# Patient Record
Sex: FEMALE | Race: WHITE | Employment: UNEMPLOYED | URBAN - METROPOLITAN AREA
[De-identification: names, ages, dates, MRNs, and addresses within clinical notes are randomized per-mention and may not be internally consistent; named-entity substitution may affect disease eponyms.]

---

## 2018-06-30 ENCOUNTER — HOSPITAL ENCOUNTER (EMERGENCY)
Age: 43
Discharge: HOME | End: 2018-06-30
Attending: EMERGENCY MEDICINE

## 2018-06-30 VITALS
DIASTOLIC BLOOD PRESSURE: 72 MMHG | WEIGHT: 110 LBS | HEIGHT: 60 IN | TEMPERATURE: 98 F | RESPIRATION RATE: 18 BRPM | BODY MASS INDEX: 21.6 KG/M2 | SYSTOLIC BLOOD PRESSURE: 101 MMHG | HEART RATE: 86 BPM | OXYGEN SATURATION: 96 %

## 2018-06-30 DIAGNOSIS — R45.851 SUICIDAL IDEATION: Primary | ICD-10-CM

## 2018-06-30 PROCEDURE — 99284 EMERGENCY DEPT VISIT MOD MDM: CPT

## 2018-06-30 ASSESSMENT — ENCOUNTER SYMPTOMS
TROUBLE SWALLOWING: 0
BLOOD IN STOOL: 0
VOMITING: 0
CHEST TIGHTNESS: 0
DIARRHEA: 0
NAUSEA: 0
ABDOMINAL PAIN: 0
RHINORRHEA: 0
SORE THROAT: 0
CONSTIPATION: 0
WHEEZING: 0
COUGH: 0
SHORTNESS OF BREATH: 0

## 2018-07-01 NOTE — ED PROVIDER NOTES
ED ATTENDING NOTE    I saw and examined the patient. I discussed the history and examination with the resident and agree with the plan of care         HPI: Pt presents with SI, no ingestion or plan, pt is inmate, pt denies f/c, ha, cp, sob, cough, ap, n/v/d. Pt is lying in bed in no acute distress. --------------------------------------------- PAST HISTORY ---------------------------------------------  Past Medical History:  has a past medical history of Anxiety and Heart murmur. Past Surgical History:  has a past surgical history that includes Tonsillectomy. Social History:  reports that she has been smoking Cigarettes. She has a 20.00 pack-year smoking history. She has never used smokeless tobacco. She reports that she drinks alcohol. She reports that she does not use drugs. Family History: family history is not on file. The patients home medications have been reviewed. Allergies: Patient has no known allergies.     ROS: Review HPI for other details  Details in electronic record may supersede details below    Gen: no chills, fever  Eyes: no discharge, no change vision  ENT: no sore throat, no rhinitis  Cardiac: no chest pain, no palpitations  Resp: no sob, no cough  GI: no abd pain, no nausea, vomiting, or diarrhea  : no dysuria, urgency, change in color  MS: no back pain, joint pain, no falls, no trauma   Skin: no rash, no itch  Neuro: no dizziness, headache, no focal paralysis or parasthesia  Psych: no hallucinations, (+) depression  Heme: no bruising, no bleeding  Other relevant systems reviewed and negative    Physical brief exam: See HPI for other details  Details in electronic record may supersede details below    Vital signs reviewed, please refer to nurses note  Constitutionall: No distress, warm, dry, well nourished, nontoxic  HENT:  NC AT, no deformity, no bleeding of gums  Eyes:  EOMI, nl lids and conjunctiva   Resp:  normal resp rate, no resp distress, no stridor  CVS:  no JVD, no visible heave  GI:  no outward sign peritonitis or splinting, non distended  Musc-Skel:  full range of motion, normal appearing, no deformities/edema/ ecchymosis  Skin:  warm and dry, normal turgor, no rashes   Neurologic: awake and alert, cooperative, Cranial Nerves II-XII grossly intact, motor& sensory grossly intact x4   Psychiatric: orientated x3, answers questions appropriately, judgment & affect grossly appropriate  Heme/ Lymph: no visible adenopathy, ecchymosis, pettichiae    ------------------------- NURSING NOTES AND VITALS REVIEWED ---------------------------  Date / Time Roomed:  6/30/2018  9:35 PM  ED Bed Assignment:  04/04    The nursing notes within the ED encounter and vital signs as below have been reviewed. Patient Vitals for the past 24 hrs:   BP Temp Pulse Resp SpO2 Height Weight   06/30/18 2140 101/72 98 °F (36.7 °C) 86 18 96 % 5' (1.524 m) 110 lb (49.9 kg)       Oxygen Saturation Interpretation: Normal      Assessment and Plan: Given that pt is inmate she doesn't need psych eval here, spoke to halfway physician she will be placed on suicide watch and pt will be evaluated by psych in the halfway, to dc back to halfway      I have reviewed the patient's medications, vital signs, and diagnostic studies available to me in the medical record at the time of the patient encounter.         Jerrod Owusu MD  06/30/18 7998

## 2024-01-11 ENCOUNTER — HOSPITAL ENCOUNTER (EMERGENCY)
Facility: HOSPITAL | Age: 49
Discharge: HOME | End: 2024-01-11
Attending: STUDENT IN AN ORGANIZED HEALTH CARE EDUCATION/TRAINING PROGRAM
Payer: MEDICAID

## 2024-01-11 VITALS
WEIGHT: 138.01 LBS | TEMPERATURE: 98 F | SYSTOLIC BLOOD PRESSURE: 116 MMHG | BODY MASS INDEX: 23.56 KG/M2 | OXYGEN SATURATION: 96 % | RESPIRATION RATE: 16 BRPM | HEIGHT: 64 IN | HEART RATE: 98 BPM | DIASTOLIC BLOOD PRESSURE: 76 MMHG

## 2024-01-11 DIAGNOSIS — F11.921: ICD-10-CM

## 2024-01-11 DIAGNOSIS — T40.604A OPIATE OVERDOSE, UNDETERMINED INTENT, INITIAL ENCOUNTER (MULTI): Primary | ICD-10-CM

## 2024-01-11 PROCEDURE — 99283 EMERGENCY DEPT VISIT LOW MDM: CPT | Performed by: STUDENT IN AN ORGANIZED HEALTH CARE EDUCATION/TRAINING PROGRAM

## 2024-01-11 RX ORDER — NALOXONE HYDROCHLORIDE 4 MG/.1ML
4 SPRAY NASAL AS NEEDED
Qty: 1 EACH | Refills: 0 | Status: ACTIVE
Start: 2024-01-11 | End: 2024-01-12

## 2024-01-11 RX ORDER — ACETAMINOPHEN 325 MG/1
975 TABLET ORAL ONCE
Status: COMPLETED | OUTPATIENT
Start: 2024-01-11 | End: 2024-01-11

## 2024-01-11 RX ADMIN — ACETAMINOPHEN 975 MG: 325 TABLET ORAL at 18:57

## 2024-01-11 ASSESSMENT — PAIN SCALES - GENERAL: PAINLEVEL_OUTOF10: 0 - NO PAIN

## 2024-01-11 ASSESSMENT — COLUMBIA-SUICIDE SEVERITY RATING SCALE - C-SSRS
1. IN THE PAST MONTH, HAVE YOU WISHED YOU WERE DEAD OR WISHED YOU COULD GO TO SLEEP AND NOT WAKE UP?: NO
2. HAVE YOU ACTUALLY HAD ANY THOUGHTS OF KILLING YOURSELF?: NO
6. HAVE YOU EVER DONE ANYTHING, STARTED TO DO ANYTHING, OR PREPARED TO DO ANYTHING TO END YOUR LIFE?: NO

## 2024-01-11 ASSESSMENT — LIFESTYLE VARIABLES
HAVE YOU EVER FELT YOU SHOULD CUT DOWN ON YOUR DRINKING: NO
REASON UNABLE TO ASSESS: NO
HAVE PEOPLE ANNOYED YOU BY CRITICIZING YOUR DRINKING: NO
EVER HAD A DRINK FIRST THING IN THE MORNING TO STEADY YOUR NERVES TO GET RID OF A HANGOVER: NO
EVER FELT BAD OR GUILTY ABOUT YOUR DRINKING: NO

## 2024-01-11 ASSESSMENT — PAIN - FUNCTIONAL ASSESSMENT: PAIN_FUNCTIONAL_ASSESSMENT: 0-10

## 2024-01-11 NOTE — ED PROVIDER NOTES
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  Patient snorted fentanyl and the next thing she knew she was being woken up by EMS with a bag to her face.  EMS reported that they were called for an unconscious female when they arrived bystander CPR was in progress.  When they checked on her she had agonal respirations but had a bounding carotid pulse.  Intranasal Narcan was given x 2 and the patient woke up.  Patient reports that she knowingly snorted fentanyl in an attempt to get high.  She denies this being a suicide attempt.  Apart from a headache she denies any additional symptoms at this time      ROS: Complete 12 point review of systems performed, otherwise negative except as noted in the history of present illness    PMH: Reviewed, documented below in note. Pertinents in HPI  PSH: Reviewed and documented below in note. Pertinents in HPI  SH: History of polysubstance abuse  Fam: Reviewed, noncontributory to patients current complaint  MEDS: Reviewed and documented below in note. Pertinents in HPI  ALLERGIES: Reviewed and documented below in note.        History provided by:  Patient and EMS personnel   used: No                          Inez Coma Scale Score: 15                  Patient History   History reviewed. No pertinent past medical history.  History reviewed. No pertinent surgical history.  No family history on file.  Social History     Tobacco Use    Smoking status: Never    Smokeless tobacco: Never   Substance Use Topics    Alcohol use: Not on file    Drug use: Not on file       Physical Exam   Visit Vitals  /76   Pulse 98   Temp 36.7 °C (98 °F)   Resp 16   Ht 1.626 m (5' 4\")   Wt 62.6 kg (138 lb 0.1 oz)   SpO2 96%   BMI 23.69 kg/m²   Smoking Status Never   BSA 1.68 m²    "   Physical Exam  Vitals and nursing note reviewed.   Constitutional:       General: She is not in acute distress.     Appearance: Normal appearance. She is not toxic-appearing.   HENT:      Head: Normocephalic and atraumatic.      Mouth/Throat:      Mouth: Mucous membranes are moist.   Eyes:      Extraocular Movements: Extraocular movements intact.      Pupils: Pupils are equal, round, and reactive to light.      Comments: Injected conjunctiva   Neck:      Vascular: No carotid bruit.   Cardiovascular:      Rate and Rhythm: Regular rhythm. Tachycardia present.      Pulses: Normal pulses.      Heart sounds: Normal heart sounds.   Pulmonary:      Effort: Pulmonary effort is normal.      Breath sounds: Normal breath sounds.   Abdominal:      General: There is no distension.      Palpations: Abdomen is soft.      Tenderness: There is no abdominal tenderness. There is no guarding or rebound.   Musculoskeletal:         General: No tenderness, deformity or signs of injury.      Cervical back: Normal range of motion. No rigidity.   Skin:     General: Skin is warm and dry.      Capillary Refill: Capillary refill takes less than 2 seconds.   Neurological:      General: No focal deficit present.      Mental Status: She is alert and oriented to person, place, and time.      Sensory: No sensory deficit.      Motor: No weakness.   Psychiatric:         Mood and Affect: Mood normal.         Behavior: Behavior normal.         No orders to display       Labs Reviewed - No data to display      ED Course & MDM   Diagnoses as of 01/12/24 0936   Opiate overdose, undetermined intent, initial encounter (CMS/[facility])           Medical Decision Making  All mentioned lab results, ECGs, and imaging were independently reviewed by myself  - Patient evaluated. Patient is presenting to the emergency department today as an intentional overdose on fentanyl.  She did receive Narcan prehospital and on my evaluation she is hemodynamically stable apart from  some tachycardia.  She is complaining of a headache.  Tylenol was ordered and the patient was observed in the emergency department for about 90 minutes which is the half-life of Narcan.  She remained hemodynamically stable, alert, protecting her airway and did not require a repeat dose.  On repeat evaluation she is requesting discharge.  I attempted to order the patient a home Narcan kit however due to [employer] nursing was unable to pull it.  The patient ultimately stated that she did not want the Narcan and felt comfortable going home as she did not plan to use again.  Patient was given strict return precautions and discharged otherwise stable condition.    - Monitored for any changes in stability or symptomatology. Patient remained stable.   - Counseled regarding labs, imaging, diagnosis, and plan. Patient was agreeable. All questions were answered. The patient was receptive and agreeable to the plan of care.   -The patient was instructed to return to the emergency department if any symptoms recurred, worsened, or if there were any additional concerns.    *Disclaimer: This note was dictated by speech recognition. Minor errors in transcription may be present. Please call with questions.    Juliocesar Rhoades MD             Your medication list        START taking these medications        Instructions Last Dose Given Next Dose Due   naloxone 4 mg/0.1 mL nasal spray  Commonly known as: Narcan      Administer 1 spray (4 mg) into affected nostril(s) if needed for opioid reversal for up to 1 day. Give 1 spray as a single dose in one nostril. May repeat every 2-3 minutes in alternating nostrils until medical assistance becomes available.                 Where to Get Your Medications        Information about where to get these medications is not yet available    Ask your nurse or doctor about these medications  naloxone 4 mg/0.1 mL nasal spray         Procedure  Procedures     *This report was transcribed using voice  recognition software.  Every effort was made to ensure accuracy; however, inadvertent computerized transcription errors may be present.*  Willie Rhoades MD  01/12/24         Willie Rhoades MD  01/12/24 0903